# Patient Record
Sex: FEMALE | Race: WHITE
[De-identification: names, ages, dates, MRNs, and addresses within clinical notes are randomized per-mention and may not be internally consistent; named-entity substitution may affect disease eponyms.]

---

## 2017-03-28 ENCOUNTER — APPOINTMENT (OUTPATIENT)
Dept: FAMILY MEDICINE | Facility: CLINIC | Age: 47
End: 2017-03-28

## 2017-03-28 ENCOUNTER — NON-APPOINTMENT (OUTPATIENT)
Age: 47
End: 2017-03-28

## 2017-03-28 VITALS
SYSTOLIC BLOOD PRESSURE: 116 MMHG | HEIGHT: 63 IN | HEART RATE: 72 BPM | OXYGEN SATURATION: 100 % | WEIGHT: 145 LBS | BODY MASS INDEX: 25.69 KG/M2 | TEMPERATURE: 98 F | DIASTOLIC BLOOD PRESSURE: 70 MMHG | RESPIRATION RATE: 12 BRPM

## 2017-03-28 DIAGNOSIS — R53.83 OTHER FATIGUE: ICD-10-CM

## 2017-03-28 DIAGNOSIS — R22.32 LOCALIZED SWELLING, MASS AND LUMP, LEFT UPPER LIMB: ICD-10-CM

## 2017-03-28 DIAGNOSIS — M79.642 PAIN IN LEFT HAND: ICD-10-CM

## 2017-03-28 DIAGNOSIS — Z00.00 ENCOUNTER FOR GENERAL ADULT MEDICAL EXAMINATION W/OUT ABNORMAL FINDINGS: ICD-10-CM

## 2017-03-28 DIAGNOSIS — Z82.49 FAMILY HISTORY OF ISCHEMIC HEART DISEASE AND OTHER DISEASES OF THE CIRCULATORY SYSTEM: ICD-10-CM

## 2017-03-28 DIAGNOSIS — R00.2 PALPITATIONS: ICD-10-CM

## 2017-03-28 DIAGNOSIS — M50.20 OTHER CERVICAL DISC DISPLACEMENT, UNSPECIFIED CERVICAL REGION: ICD-10-CM

## 2017-03-28 DIAGNOSIS — M85.80 OTHER SPECIFIED DISORDERS OF BONE DENSITY AND STRUCTURE, UNSPECIFIED SITE: ICD-10-CM

## 2017-03-29 PROBLEM — R22.32 LUMP ON FINGER, LEFT: Status: ACTIVE | Noted: 2017-03-28

## 2017-03-29 PROBLEM — M79.642 PAIN OF LEFT HAND: Status: ACTIVE | Noted: 2017-03-28

## 2017-06-01 ENCOUNTER — APPOINTMENT (OUTPATIENT)
Dept: ORTHOPEDIC SURGERY | Facility: CLINIC | Age: 47
End: 2017-06-01

## 2017-06-01 VITALS
HEIGHT: 63 IN | SYSTOLIC BLOOD PRESSURE: 103 MMHG | BODY MASS INDEX: 25.69 KG/M2 | DIASTOLIC BLOOD PRESSURE: 66 MMHG | HEART RATE: 84 BPM | WEIGHT: 145 LBS

## 2017-06-01 DIAGNOSIS — Z78.9 OTHER SPECIFIED HEALTH STATUS: ICD-10-CM

## 2017-06-01 DIAGNOSIS — M19.042 PRIMARY OSTEOARTHRITIS, LEFT HAND: ICD-10-CM

## 2017-06-01 DIAGNOSIS — M19.041 PRIMARY OSTEOARTHRITIS, RIGHT HAND: ICD-10-CM

## 2018-07-28 PROBLEM — Z78.9 ALCOHOL USE: Status: ACTIVE | Noted: 2017-03-28

## 2018-10-08 ENCOUNTER — TRANSCRIPTION ENCOUNTER (OUTPATIENT)
Age: 48
End: 2018-10-08

## 2019-02-26 ENCOUNTER — TRANSCRIPTION ENCOUNTER (OUTPATIENT)
Age: 49
End: 2019-02-26

## 2019-04-04 ENCOUNTER — TRANSCRIPTION ENCOUNTER (OUTPATIENT)
Age: 49
End: 2019-04-04

## 2019-04-07 ENCOUNTER — TRANSCRIPTION ENCOUNTER (OUTPATIENT)
Age: 49
End: 2019-04-07

## 2019-08-26 ENCOUNTER — RESULT REVIEW (OUTPATIENT)
Age: 49
End: 2019-08-26

## 2019-09-01 ENCOUNTER — TRANSCRIPTION ENCOUNTER (OUTPATIENT)
Age: 49
End: 2019-09-01

## 2020-06-28 ENCOUNTER — TRANSCRIPTION ENCOUNTER (OUTPATIENT)
Age: 50
End: 2020-06-28

## 2020-09-22 ENCOUNTER — TRANSCRIPTION ENCOUNTER (OUTPATIENT)
Age: 50
End: 2020-09-22

## 2020-10-05 ENCOUNTER — TRANSCRIPTION ENCOUNTER (OUTPATIENT)
Age: 50
End: 2020-10-05

## 2023-04-02 ENCOUNTER — NON-APPOINTMENT (OUTPATIENT)
Age: 53
End: 2023-04-02

## 2023-04-28 ENCOUNTER — NON-APPOINTMENT (OUTPATIENT)
Age: 53
End: 2023-04-28

## 2024-08-29 ENCOUNTER — NON-APPOINTMENT (OUTPATIENT)
Age: 54
End: 2024-08-29

## 2024-08-29 ENCOUNTER — APPOINTMENT (OUTPATIENT)
Dept: CARDIOLOGY | Facility: CLINIC | Age: 54
End: 2024-08-29
Payer: COMMERCIAL

## 2024-08-29 VITALS
WEIGHT: 144 LBS | BODY MASS INDEX: 25.52 KG/M2 | OXYGEN SATURATION: 98 % | HEART RATE: 67 BPM | SYSTOLIC BLOOD PRESSURE: 120 MMHG | DIASTOLIC BLOOD PRESSURE: 76 MMHG | HEIGHT: 63 IN

## 2024-08-29 DIAGNOSIS — E78.5 HYPERLIPIDEMIA, UNSPECIFIED: ICD-10-CM

## 2024-08-29 DIAGNOSIS — R00.2 PALPITATIONS: ICD-10-CM

## 2024-08-29 PROCEDURE — 93000 ELECTROCARDIOGRAM COMPLETE: CPT

## 2024-08-29 PROCEDURE — 99204 OFFICE O/P NEW MOD 45 MIN: CPT

## 2024-08-29 PROCEDURE — G2211 COMPLEX E/M VISIT ADD ON: CPT

## 2024-08-29 NOTE — DISCUSSION/SUMMARY
[FreeTextEntry1] : 54F chronic intermittent palpitation, fatigue, borderline HLD () had recent outside CT-calcium score 0, presents for cardiology evaluation.   Chronic intermittent palpitations with normal EKG, given no prior Holter monitoring done will obtain 72hrs Holter to exclude arrhythmia, obtain TTE to assess structural heart function and also exercise treadmill EKG test as she is worried about if she could have noncalcified CAD despite zero calcium score.   HLD- continue diet control as 10yrs ASCVD risk <7.5%    Follow up in 6 months if the above tests are normal.  [EKG obtained to assist in diagnosis and management of assessed problem(s)] : EKG obtained to assist in diagnosis and management of assessed problem(s)

## 2024-08-29 NOTE — HISTORY OF PRESENT ILLNESS
[FreeTextEntry1] : 54F chronic intermittent palpitation, fatigue, borderline HLD () had recent outside CT-calcium score 0, presents for cardiology evaluation.   Reports intermittent palpitations for > 10 years lasting few minutes, no prior syncope, no prior Holter done. Baseline no routine exercise but denies chest discomfort, been bothered by arthritic pain pending rheumatology workup, also with social stressors. Her  also had CT-Ca score done incidentally found with aortic aneurysm, and also another friend who is avid with bicycyling without symptoms diagnosed with a "window-maker" MI.  Recollect last Echo >20yrs ago told with some anomaly, daughter with PDA   Father with CAD/CABG age 80s Brother with CAD/stent age 40s  Nonsmoker, no alcohol use, vegan diet but eggs/fish sparingly  Working as speech pathologist

## 2024-09-17 ENCOUNTER — APPOINTMENT (OUTPATIENT)
Dept: CARDIOLOGY | Facility: CLINIC | Age: 54
End: 2024-09-17
Payer: COMMERCIAL

## 2024-09-17 PROCEDURE — 93306 TTE W/DOPPLER COMPLETE: CPT

## 2024-09-17 PROCEDURE — 93015 CV STRESS TEST SUPVJ I&R: CPT

## 2024-09-18 ENCOUNTER — TRANSCRIPTION ENCOUNTER (OUTPATIENT)
Age: 54
End: 2024-09-18

## 2024-09-20 ENCOUNTER — TRANSCRIPTION ENCOUNTER (OUTPATIENT)
Age: 54
End: 2024-09-20

## 2025-06-16 ENCOUNTER — DOCTOR'S OFFICE (OUTPATIENT)
Facility: LOCATION | Age: 55
Setting detail: OPHTHALMOLOGY
End: 2025-06-16
Payer: COMMERCIAL

## 2025-06-16 DIAGNOSIS — H43.821: ICD-10-CM

## 2025-06-16 DIAGNOSIS — H35.411: ICD-10-CM

## 2025-06-16 DIAGNOSIS — H43.393: ICD-10-CM

## 2025-06-16 PROBLEM — B02.39 HERPES ZOSTER EYELID: Status: ACTIVE | Noted: 2025-06-16

## 2025-06-16 PROCEDURE — 92201 OPSCPY EXTND RTA DRAW UNI/BI: CPT | Performed by: OPHTHALMOLOGY

## 2025-06-16 PROCEDURE — 92134 CPTRZ OPH DX IMG PST SGM RTA: CPT | Performed by: OPHTHALMOLOGY

## 2025-06-16 PROCEDURE — 92004 COMPRE OPH EXAM NEW PT 1/>: CPT | Performed by: OPHTHALMOLOGY

## 2025-06-16 ASSESSMENT — VISUAL ACUITY
OD_BCVA: 20/20
OS_BCVA: 20/20

## 2025-07-07 ENCOUNTER — DOCTOR'S OFFICE (OUTPATIENT)
Facility: LOCATION | Age: 55
Setting detail: OPHTHALMOLOGY
End: 2025-07-07
Payer: COMMERCIAL

## 2025-07-07 DIAGNOSIS — B02.39: ICD-10-CM

## 2025-07-07 DIAGNOSIS — H25.13: ICD-10-CM

## 2025-07-07 PROCEDURE — 92012 INTRM OPH EXAM EST PATIENT: CPT | Performed by: OPHTHALMOLOGY

## 2025-07-07 ASSESSMENT — KERATOMETRY
OD_K2POWER_DIOPTERS: 43.50
OD_AXISANGLE_DEGREES: 076
OS_K2POWER_DIOPTERS: 43.50
OS_K1POWER_DIOPTERS: 43.25
OD_K1POWER_DIOPTERS: 43.00
OS_AXISANGLE_DEGREES: 114

## 2025-07-07 ASSESSMENT — CONFRONTATIONAL VISUAL FIELD TEST (CVF)
OD_FINDINGS: FULL
OS_FINDINGS: FULL

## 2025-07-07 ASSESSMENT — REFRACTION_AUTOREFRACTION
OD_CYLINDER: +0.75
OS_SPHERE: -0.25
OD_SPHERE: +0.50
OD_AXIS: 004
OS_AXIS: 170
OS_CYLINDER: +1.00

## 2025-07-07 ASSESSMENT — VISUAL ACUITY
OS_BCVA: 20/20-1
OD_BCVA: 20/20